# Patient Record
Sex: FEMALE | Race: WHITE | NOT HISPANIC OR LATINO | Employment: FULL TIME | URBAN - METROPOLITAN AREA
[De-identification: names, ages, dates, MRNs, and addresses within clinical notes are randomized per-mention and may not be internally consistent; named-entity substitution may affect disease eponyms.]

---

## 2023-04-12 ENCOUNTER — HOSPITAL ENCOUNTER (EMERGENCY)
Facility: HOSPITAL | Age: 18
Discharge: HOME | End: 2023-04-12
Attending: EMERGENCY MEDICINE
Payer: COMMERCIAL

## 2023-04-12 VITALS
HEIGHT: 63 IN | HEART RATE: 78 BPM | WEIGHT: 125 LBS | OXYGEN SATURATION: 100 % | TEMPERATURE: 98 F | RESPIRATION RATE: 16 BRPM | SYSTOLIC BLOOD PRESSURE: 102 MMHG | DIASTOLIC BLOOD PRESSURE: 60 MMHG | BODY MASS INDEX: 22.15 KG/M2

## 2023-04-12 DIAGNOSIS — N39.0 URINARY TRACT INFECTION WITHOUT HEMATURIA, SITE UNSPECIFIED: ICD-10-CM

## 2023-04-12 DIAGNOSIS — R09.1 PLEURISY: Primary | ICD-10-CM

## 2023-04-12 LAB
ALBUMIN SERPL BCP-MCNC: 4.7 G/DL (ref 3.5–5.2)
ALP SERPL-CCNC: 56 U/L (ref 55–135)
ALT SERPL W/O P-5'-P-CCNC: 31 U/L (ref 10–44)
ANION GAP SERPL CALC-SCNC: 6 MMOL/L (ref 8–16)
AST SERPL-CCNC: 26 U/L (ref 10–40)
B-HCG UR QL: NEGATIVE
BACTERIA #/AREA URNS HPF: ABNORMAL /HPF
BILIRUB SERPL-MCNC: 1.6 MG/DL (ref 0.1–1)
BILIRUB UR QL STRIP: NEGATIVE
BNP SERPL-MCNC: 14 PG/ML (ref 0–99)
BUN SERPL-MCNC: 11 MG/DL (ref 6–20)
CALCIUM SERPL-MCNC: 9.4 MG/DL (ref 8.7–10.5)
CHLORIDE SERPL-SCNC: 107 MMOL/L (ref 95–110)
CLARITY UR: ABNORMAL
CO2 SERPL-SCNC: 24 MMOL/L (ref 23–29)
COLOR UR: YELLOW
CREAT SERPL-MCNC: 0.6 MG/DL (ref 0.5–1.4)
CTP QC/QA: YES
D DIMER PPP IA.FEU-MCNC: 0.24 MG/L FEU
EST. GFR  (NO RACE VARIABLE): >60 ML/MIN/1.73 M^2
GLUCOSE SERPL-MCNC: 115 MG/DL (ref 70–110)
GLUCOSE UR QL STRIP: NEGATIVE
HGB UR QL STRIP: NEGATIVE
HYALINE CASTS #/AREA URNS LPF: 1 /LPF
KETONES UR QL STRIP: ABNORMAL
LEUKOCYTE ESTERASE UR QL STRIP: ABNORMAL
MAGNESIUM SERPL-MCNC: 1.7 MG/DL (ref 1.6–2.6)
MICROSCOPIC COMMENT: ABNORMAL
NITRITE UR QL STRIP: NEGATIVE
PH UR STRIP: 6 [PH] (ref 5–8)
POTASSIUM SERPL-SCNC: 4.2 MMOL/L (ref 3.5–5.1)
PROT SERPL-MCNC: 7.6 G/DL (ref 6–8.4)
PROT UR QL STRIP: ABNORMAL
RBC #/AREA URNS HPF: 3 /HPF (ref 0–4)
SODIUM SERPL-SCNC: 137 MMOL/L (ref 136–145)
SP GR UR STRIP: 1.02 (ref 1–1.03)
SQUAMOUS #/AREA URNS HPF: 7 /HPF
TROPONIN I SERPL HS-MCNC: <2.3 PG/ML (ref 0–14.9)
URN SPEC COLLECT METH UR: ABNORMAL
UROBILINOGEN UR STRIP-ACNC: NEGATIVE EU/DL
WBC #/AREA URNS HPF: 3 /HPF (ref 0–5)

## 2023-04-12 PROCEDURE — 85379 FIBRIN DEGRADATION QUANT: CPT | Performed by: EMERGENCY MEDICINE

## 2023-04-12 PROCEDURE — 81025 URINE PREGNANCY TEST: CPT | Performed by: EMERGENCY MEDICINE

## 2023-04-12 PROCEDURE — 83735 ASSAY OF MAGNESIUM: CPT | Performed by: EMERGENCY MEDICINE

## 2023-04-12 PROCEDURE — 81001 URINALYSIS AUTO W/SCOPE: CPT | Performed by: EMERGENCY MEDICINE

## 2023-04-12 PROCEDURE — 84484 ASSAY OF TROPONIN QUANT: CPT | Mod: 91 | Performed by: EMERGENCY MEDICINE

## 2023-04-12 PROCEDURE — 80053 COMPREHEN METABOLIC PANEL: CPT | Performed by: EMERGENCY MEDICINE

## 2023-04-12 PROCEDURE — 93005 ELECTROCARDIOGRAM TRACING: CPT | Performed by: INTERNAL MEDICINE

## 2023-04-12 PROCEDURE — 93010 EKG 12-LEAD: ICD-10-PCS | Mod: ,,, | Performed by: INTERNAL MEDICINE

## 2023-04-12 PROCEDURE — 93010 ELECTROCARDIOGRAM REPORT: CPT | Mod: ,,, | Performed by: INTERNAL MEDICINE

## 2023-04-12 PROCEDURE — 99284 EMERGENCY DEPT VISIT MOD MDM: CPT | Mod: 25

## 2023-04-12 PROCEDURE — 83880 ASSAY OF NATRIURETIC PEPTIDE: CPT | Performed by: EMERGENCY MEDICINE

## 2023-04-12 RX ORDER — CEPHALEXIN 500 MG/1
500 CAPSULE ORAL EVERY 12 HOURS
Qty: 14 CAPSULE | Refills: 0 | Status: SHIPPED | OUTPATIENT
Start: 2023-04-12 | End: 2023-04-19

## 2023-04-12 NOTE — DISCHARGE INSTRUCTIONS
Take oral steroids as directed   Keflex as directed until all gone  Please follow-up with your primary care provider   Return for any concerns

## 2023-04-12 NOTE — ED PROVIDER NOTES
"Encounter Date: 4/12/2023       History     Chief Complaint   Patient presents with    Back Pain     UPPER X 2 DAYS, HURTS WITH BREATH. SEEN YESTERDAY FOR SAME, RX FOR PREDNISONE NOT FILLED     18-year-old female presents emergency department with complaint of pain to her upper back and anterior chest for the last 2 days reports breathing deeply makes the pain worse denies any recent trauma reports that she accidentally got some chemicals in her ice 2 weeks ago she is unsure if she may have inhaled chemicals which is giving her symptoms.  The patient was seen for the same complaint at [address] [place] [address] yesterday diagnosed with pleurisy shot of steroids and discharged home with oral steroids which she is not filled.  Patient is here requesting to be "worked up" she states they did no x-rays or labs.  The patient does not smoke, she is not on birth control pills no personal family history of DVT/PE    Review of patient's allergies indicates:  No Known Allergies  No past medical history on file.  No past surgical history on file.  No family history on file.     Review of Systems   Constitutional: Negative.    HENT: Negative.     Respiratory:          Pain with breathing   Cardiovascular: Negative.    Gastrointestinal: Negative.    Musculoskeletal: Negative.    Neurological: Negative.    Hematological: Negative.    Psychiatric/Behavioral: Negative.     All other systems reviewed and are negative.    Physical Exam     Initial Vitals [04/12/23 1126]   BP Pulse Resp Temp SpO2   104/77 97 16 98.7 °F (37.1 °C) 100 %      MAP       --         Physical Exam    Nursing note and vitals reviewed.  Constitutional: She appears well-developed and well-nourished.   HENT:   Head: Normocephalic.   Right Ear: External ear normal.   Left Ear: External ear normal.   Eyes: EOM are normal. Pupils are equal, round, and reactive to light.   Neck: Neck supple.   Normal range of motion.  Cardiovascular:  Normal rate, regular rhythm, normal heart " sounds and intact distal pulses.           Pulmonary/Chest: Breath sounds normal. No respiratory distress.   Abdominal: Abdomen is soft. Bowel sounds are normal.   Musculoskeletal:      Cervical back: Normal range of motion and neck supple.     Neurological: She is alert and oriented to person, place, and time. She has normal strength. GCS score is 15. GCS eye subscore is 4. GCS verbal subscore is 5. GCS motor subscore is 6.   Skin: No rash noted.   Psychiatric: She has a normal mood and affect.       ED Course   Procedures  Labs Reviewed   COMPREHENSIVE METABOLIC PANEL - Abnormal; Notable for the following components:       Result Value    Glucose 115 (*)     Total Bilirubin 1.6 (*)     Anion Gap 6 (*)     All other components within normal limits   URINALYSIS, REFLEX TO URINE CULTURE - Abnormal; Notable for the following components:    Appearance, UA Hazy (*)     Protein, UA Trace (*)     Ketones, UA 2+ (*)     Leukocytes, UA Trace (*)     All other components within normal limits    Narrative:     Specimen Source->Urine   URINALYSIS MICROSCOPIC - Abnormal; Notable for the following components:    Bacteria Moderate (*)     All other components within normal limits    Narrative:     Specimen Source->Urine   MAGNESIUM   TROPONIN I HIGH SENSITIVITY   TROPONIN I HIGH SENSITIVITY   B-TYPE NATRIURETIC PEPTIDE   D DIMER, QUANTITATIVE   CBC W/ AUTO DIFFERENTIAL   CBC W/ AUTO DIFFERENTIAL   TROPONIN I HIGH SENSITIVITY   POCT URINE PREGNANCY        ECG Results              EKG 12-lead (In process)  Result time 04/12/23 12:17:03      In process by Interface, Lab In [facility] (04/12/23 12:17:03)                   Narrative:    Test Reason : R07.9,    Vent. Rate : 080 BPM     Atrial Rate : 080 BPM     P-R Int : 130 ms          QRS Dur : 080 ms      QT Int : 374 ms       P-R-T Axes : 026 091 060 degrees     QTc Int : 431 ms    Sinus rhythm with marked sinus arrythmia  Rightward axis  Borderline Abnormal ECG  No previous ECGs  "available    Referred By: [email or web address]   SELF           Confirmed By:                                   Imaging Results              X-Ray Chest AP Portable (Final result)  Result time 04/12/23 12:54:59      Final result by Isak Pearce MD (04/12/23 12:54:59)                   Narrative:    CLINICAL HISTORY:  18 years (2005) Female Chest Pain Back Pain (UPPER X 2 DAYS, HURTS WITH BREATH. SEEN YESTERDAY FOR SAME    TECHNIQUE:  Portable AP radiograph the chest.    COMPARISON:  None available.    FINDINGS:  The lungs are clear. Costophrenic angles are seen without effusion. No pneumothorax is identified. The heart is normal in size. The mediastinum is within normal limits. Osseous structures appear within normal limits. The visualized upper abdomen is unremarkable.    IMPRESSION:  No acute cardiac or pulmonary process.                  .            Electronically signed by:  Isak Pearce MD  4/12/2023 12:54 PM CDT Workstation: [id number]                                     Medications - No data to display  Medical Decision Making:   History:   Old Records Summarized: records from previous admission(s).  Initial Assessment:   18-year-old female presents emergency department with complaint of pain to her upper back and anterior chest for the last 2 days reports breathing deeply makes the pain worse denies any recent trauma reports that she accidentally got some chemicals in her ice 2 weeks ago she is unsure if she may have inhaled chemicals which is giving her symptoms.  The patient was seen for the same complaint at [address] [place] [address] yesterday diagnosed with pleurisy shot of steroids and discharged home with oral steroids which she is not filled.  Patient is here requesting to be "worked up" she states they did no x-rays or labs.  The patient does not smoke, she is not on birth control pills no personal family history of DVT/PE    Differential Diagnosis:   Considerations include electrolyte abnormalities, " pneumonia, pleurisy, PE, ACS  Clinical Tests:   Lab Tests: Ordered and Reviewed  Radiological Study: Ordered and Reviewed  Medical Tests: Ordered and Reviewed  ED Management:  18-year-old female presents emergency department for emergent evaluation of pain to her upper back when she breathes.  Patient concerned because she had a chemical/your eyes was unsure she may have inhaled any fumes.  Patient is not hypoxic she is no respiratory distress, no evidence of chemical pneumonitis on chest x-ray, and no pneumonia.  Patient's breath sounds are clear bilaterally.  Remaining labs unremarkable including troponin and EKG reveals normal sinus rhythm.  Unlikely this is ACS.  Patient has no risk factors for PE/DVT, she does not smoke, she does not take birth control no personal family history no recent surgery and D-dimer is normal secondary to no risk factors and normal D-dimer do not feel the patient needs any further emergent imaging as PE is less likely.  Given patient's presentation I do agree that patient likely has pleurisy she was given an injection of steroids a hospital last p.m. and she was given a prescription for steroids which she is not filled patient will be instructed to take oral steroids as prescribed she does have bacteria noted her urine she will be prescribed Keflex for the UTI and given return precautions          Attending Attestation:     Physician Attestation Statement for NP/PA:       Other NP/PA Attestation Additions:    History of Present Illness: I was not called upon to see this patient but was available for consultation                           Clinical Impression:   Final diagnoses:  [R09.1] Pleurisy (Primary)  [N39.0] Urinary tract infection without hematuria, site unspecified        ED Disposition Condition    Discharge Stable          ED Prescriptions       Medication Sig Dispense Start Date End Date Auth. Provider    cephALEXin (KEFLEX) 500 MG capsule Take 1 capsule (500 mg total) by  mouth every 12 (twelve) hours. for 7 days 14 capsule 4/12/2023 4/19/2023 CHELO Vanessa          Follow-up Information       Follow up With Specialties Details Why Contact Info    Raffy Fair MD Pediatrics Schedule an appointment as soon as possible for a visit in 2 days  [address]   [place] [address]  [phone]               CHELO Vanessa  04/12/23 145       Dusty Henriquez MD  04/12/23 4888